# Patient Record
Sex: MALE | Race: WHITE
[De-identification: names, ages, dates, MRNs, and addresses within clinical notes are randomized per-mention and may not be internally consistent; named-entity substitution may affect disease eponyms.]

---

## 2017-05-25 ENCOUNTER — PRIOR ORIGINAL RECORDS (OUTPATIENT)
Dept: OTHER | Age: 57
End: 2017-05-25

## 2017-06-02 ENCOUNTER — PRIOR ORIGINAL RECORDS (OUTPATIENT)
Dept: OTHER | Age: 57
End: 2017-06-02

## 2018-01-16 ENCOUNTER — HOSPITAL ENCOUNTER (OUTPATIENT)
Dept: LAB | Facility: HOSPITAL | Age: 58
Discharge: HOME | End: 2018-01-16
Attending: INTERNAL MEDICINE
Payer: COMMERCIAL

## 2018-01-16 ENCOUNTER — PRIOR ORIGINAL RECORDS (OUTPATIENT)
Dept: OTHER | Age: 58
End: 2018-01-16

## 2018-01-16 LAB
ALBUMIN SERPL-MCNC: 4.2 G/DL (ref 3.5–4.8)
ALP LIVER SERPL-CCNC: 73 U/L (ref 45–117)
ALT SERPL-CCNC: 21 U/L (ref 17–63)
AST SERPL-CCNC: 18 U/L (ref 15–41)
BILIRUB SERPL-MCNC: 1.1 MG/DL (ref 0.1–2)
BUN BLD-MCNC: 30 MG/DL (ref 8–20)
CALCIUM BLD-MCNC: 8.8 MG/DL (ref 8.3–10.3)
CHLORIDE: 109 MMOL/L (ref 101–111)
CHOLEST SMN-MCNC: 223 MG/DL (ref ?–200)
CO2: 26 MMOL/L (ref 22–32)
CREAT BLD-MCNC: 1.5 MG/DL (ref 0.7–1.3)
GLUCOSE BLD-MCNC: 92 MG/DL (ref 70–99)
HDLC SERPL-MCNC: 34 MG/DL (ref 45–?)
HDLC SERPL: 6.56 {RATIO} (ref ?–4.97)
LDLC SERPL CALC-MCNC: 137 MG/DL (ref ?–130)
M PROTEIN MFR SERPL ELPH: 7.4 G/DL (ref 6.1–8.3)
NONHDLC SERPL-MCNC: 189 MG/DL (ref ?–130)
POTASSIUM SERPL-SCNC: 4 MMOL/L (ref 3.6–5.1)
SODIUM SERPL-SCNC: 143 MMOL/L (ref 136–144)
TRIGL SERPL-MCNC: 260 MG/DL (ref ?–150)
VLDLC SERPL CALC-MCNC: 52 MG/DL (ref 5–40)

## 2018-01-16 PROCEDURE — 80061 LIPID PANEL: CPT | Performed by: INTERNAL MEDICINE

## 2018-01-16 PROCEDURE — 36415 COLL VENOUS BLD VENIPUNCTURE: CPT | Performed by: INTERNAL MEDICINE

## 2018-01-16 PROCEDURE — 80053 COMPREHEN METABOLIC PANEL: CPT | Performed by: INTERNAL MEDICINE

## 2018-01-17 LAB
ALKALINE PHOSPHATATE(ALK PHOS): 73 IU/L
BILIRUBIN TOTAL: 1.1 MG/DL
BUN: 30 MG/DL
CALCIUM: 8.8 MG/DL
CHLORIDE: 109 MEQ/L
CHOLESTEROL, TOTAL: 223 MG/DL
CREATININE, SERUM: 1.5 MG/DL
GLUCOSE: 92 MG/DL
HDL CHOLESTEROL: 34 MG/DL
LDL CHOLESTEROL: 137 MG/DL
POTASSIUM, SERUM: 4 MEQ/L
PROTEIN, TOTAL: 7.4 G/DL
SGOT (AST): 18 IU/L
SGPT (ALT): 21 IU/L
SODIUM: 143 MEQ/L
TRIGLYCERIDES: 260 MG/DL

## 2018-01-18 ENCOUNTER — PRIOR ORIGINAL RECORDS (OUTPATIENT)
Dept: OTHER | Age: 58
End: 2018-01-18

## 2018-01-18 ENCOUNTER — MYAURORA ACCOUNT LINK (OUTPATIENT)
Dept: OTHER | Age: 58
End: 2018-01-18

## 2018-09-07 NOTE — ED NOTES
Attempted x 2 to contact patient's family. Voicemail message received both times, message just has phone number without identifying information so message was not left for patient's family.
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
## 2018-09-07 NOTE — ED PROVIDER NOTES
Patient Seen in: [facility] Emergency Department    History   Patient presents with:  Eval-P (psychiatric)    Stated Complaint: demenita patient agressive toward staff cooperative en route    HPI    Patient is a 59-year-old male who presents from [facility] and nontender throughout. No rebound or guarding  Extremities: No clubbing/cyanosis/edema. Skin: No rashes, no pallor  Neuro: Alert to baseline. Answering questions. Moves all extremities with equal strength. Normal speech.   ED Course     Labs Reviewe encounter diagnosis)  Mild dehydration  Disposition:  Discharge  9/7/2018 10:23 am    Follow-up:  Nona Hassan MD  [facility] [phone]    In 1 week          Medications Prescribed:  Discharge Medication List as of 9

## 2018-09-07 NOTE — ED NOTES
Attempted to call report to nursing facility. Received voicemail for the director of nursing. Message left on voicemail that patient will be coming back to their facility.  Yo Otto, [address] [place] [address] worker did speak with nursing facility staff earlier and let them k

## 2019-01-23 ENCOUNTER — PRIOR ORIGINAL RECORDS (OUTPATIENT)
Dept: OTHER | Age: 59
End: 2019-01-23

## 2019-02-28 VITALS
HEART RATE: 72 BPM | DIASTOLIC BLOOD PRESSURE: 98 MMHG | WEIGHT: 204 LBS | BODY MASS INDEX: 27.04 KG/M2 | HEIGHT: 73 IN | SYSTOLIC BLOOD PRESSURE: 162 MMHG

## 2019-10-12 ENCOUNTER — NURSE ONLY (OUTPATIENT)
Dept: LAB | Age: 59
End: 2019-10-12
Attending: FAMILY MEDICINE
Payer: COMMERCIAL

## 2019-10-12 DIAGNOSIS — Z00.00 ROUTINE GENERAL MEDICAL EXAMINATION AT A HEALTH CARE FACILITY: Primary | ICD-10-CM

## 2019-10-12 PROCEDURE — 84443 ASSAY THYROID STIM HORMONE: CPT

## 2019-10-12 PROCEDURE — 85025 COMPLETE CBC W/AUTO DIFF WBC: CPT

## 2019-10-12 PROCEDURE — 80061 LIPID PANEL: CPT

## 2019-10-12 PROCEDURE — 80053 COMPREHEN METABOLIC PANEL: CPT

## 2019-10-12 PROCEDURE — 36415 COLL VENOUS BLD VENIPUNCTURE: CPT

## 2019-10-16 ENCOUNTER — NURSE ONLY (OUTPATIENT)
Dept: LAB | Age: 59
End: 2019-10-16
Attending: FAMILY MEDICINE

## 2019-10-16 DIAGNOSIS — Z01.411 ENCNTR FOR GYN EXAM (GENERAL) (ROUTINE) W ABNORMAL FINDINGS: Primary | ICD-10-CM

## 2019-10-16 PROCEDURE — 84481 FREE ASSAY (FT-3): CPT

## 2019-10-16 PROCEDURE — 36415 COLL VENOUS BLD VENIPUNCTURE: CPT

## 2019-10-16 PROCEDURE — 84439 ASSAY OF FREE THYROXINE: CPT

## 2019-11-16 ENCOUNTER — NURSE ONLY (OUTPATIENT)
Dept: LAB | Age: 59
End: 2019-11-16
Attending: FAMILY MEDICINE
Payer: COMMERCIAL

## 2019-11-16 DIAGNOSIS — R69 DIAGNOSIS UNKNOWN: Primary | ICD-10-CM

## 2019-11-16 PROCEDURE — 84439 ASSAY OF FREE THYROXINE: CPT

## 2019-11-16 PROCEDURE — 36415 COLL VENOUS BLD VENIPUNCTURE: CPT

## 2019-11-16 PROCEDURE — 84443 ASSAY THYROID STIM HORMONE: CPT

## 2019-12-14 ENCOUNTER — NURSE ONLY (OUTPATIENT)
Dept: LAB | Age: 59
End: 2019-12-14
Attending: FAMILY MEDICINE
Payer: COMMERCIAL

## 2019-12-14 DIAGNOSIS — E03.9 HYPOTHYROIDISM: Primary | ICD-10-CM

## 2019-12-14 PROCEDURE — 84443 ASSAY THYROID STIM HORMONE: CPT

## 2019-12-14 PROCEDURE — 36415 COLL VENOUS BLD VENIPUNCTURE: CPT

## 2019-12-14 PROCEDURE — 84436 ASSAY OF TOTAL THYROXINE: CPT

## 2019-12-14 PROCEDURE — 84480 ASSAY TRIIODOTHYRONINE (T3): CPT

## 2020-04-30 ENCOUNTER — NURSE ONLY (OUTPATIENT)
Dept: LAB | Age: 60
End: 2020-04-30
Attending: INTERNAL MEDICINE
Payer: COMMERCIAL

## 2020-04-30 DIAGNOSIS — N39.0 UTI (URINARY TRACT INFECTION): Primary | ICD-10-CM

## 2020-04-30 PROCEDURE — 81003 URINALYSIS AUTO W/O SCOPE: CPT

## 2020-04-30 PROCEDURE — 87086 URINE CULTURE/COLONY COUNT: CPT

## 2020-04-30 PROCEDURE — 87077 CULTURE AEROBIC IDENTIFY: CPT

## 2020-04-30 PROCEDURE — 87186 SC STD MICRODIL/AGAR DIL: CPT

## 2020-10-17 ENCOUNTER — NURSE ONLY (OUTPATIENT)
Dept: LAB | Age: 60
End: 2020-10-17
Attending: INTERNAL MEDICINE
Payer: OTHER GOVERNMENT

## 2020-10-17 DIAGNOSIS — U07.1 COVID-19: Primary | ICD-10-CM

## 2020-10-17 PROCEDURE — 85025 COMPLETE CBC W/AUTO DIFF WBC: CPT

## 2020-10-17 PROCEDURE — 36415 COLL VENOUS BLD VENIPUNCTURE: CPT

## 2020-10-17 PROCEDURE — 85610 PROTHROMBIN TIME: CPT

## 2020-10-17 PROCEDURE — 80048 BASIC METABOLIC PNL TOTAL CA: CPT

## 2020-11-07 ENCOUNTER — NURSE ONLY (OUTPATIENT)
Dept: LAB | Age: 60
End: 2020-11-07
Attending: INTERNAL MEDICINE
Payer: COMMERCIAL

## 2020-11-07 DIAGNOSIS — R69 DIAGNOSIS UNKNOWN: Primary | ICD-10-CM

## 2020-11-07 PROCEDURE — 85025 COMPLETE CBC W/AUTO DIFF WBC: CPT

## 2020-11-07 PROCEDURE — 36415 COLL VENOUS BLD VENIPUNCTURE: CPT

## 2020-11-07 PROCEDURE — 80048 BASIC METABOLIC PNL TOTAL CA: CPT

## 2021-05-27 ENCOUNTER — APPOINTMENT (OUTPATIENT)
Dept: GENERAL RADIOLOGY | Facility: HOSPITAL | Age: 61
DRG: 074 | End: 2021-05-27
Attending: EMERGENCY MEDICINE
Payer: MEDICARE

## 2021-05-27 ENCOUNTER — APPOINTMENT (OUTPATIENT)
Dept: CT IMAGING | Facility: HOSPITAL | Age: 61
DRG: 074 | End: 2021-05-27
Attending: EMERGENCY MEDICINE
Payer: MEDICARE

## 2021-05-27 ENCOUNTER — HOSPITAL ENCOUNTER (INPATIENT)
Facility: HOSPITAL | Age: 61
LOS: 4 days | Discharge: NURSING, REHAB OR LONG-TERM CARE | DRG: 074 | End: 2021-05-31
Attending: EMERGENCY MEDICINE | Admitting: HOSPITALIST
Payer: MEDICARE

## 2021-05-27 DIAGNOSIS — L03.211 FACIAL CELLULITIS: ICD-10-CM

## 2021-05-27 DIAGNOSIS — R73.9 HYPERGLYCEMIA: ICD-10-CM

## 2021-05-27 DIAGNOSIS — R26.9 GAIT DISTURBANCE: ICD-10-CM

## 2021-05-27 DIAGNOSIS — R41.82 ALTERED MENTAL STATUS, UNSPECIFIED ALTERED MENTAL STATUS TYPE: Primary | ICD-10-CM

## 2021-05-27 PROCEDURE — 70450 CT HEAD/BRAIN W/O DYE: CPT | Performed by: EMERGENCY MEDICINE

## 2021-05-27 PROCEDURE — 99223 1ST HOSP IP/OBS HIGH 75: CPT | Performed by: HOSPITALIST

## 2021-05-27 PROCEDURE — 71045 X-RAY EXAM CHEST 1 VIEW: CPT | Performed by: EMERGENCY MEDICINE

## 2021-05-27 RX ORDER — ENOXAPARIN SODIUM 100 MG/ML
40 INJECTION SUBCUTANEOUS DAILY
Status: DISCONTINUED | OUTPATIENT
Start: 2021-05-27 | End: 2021-05-31

## 2021-05-27 RX ORDER — CLINDAMYCIN HYDROCHLORIDE 300 MG/1
300 CAPSULE ORAL 3 TIMES DAILY
Status: ON HOLD | COMMUNITY
End: 2021-05-28

## 2021-05-27 RX ORDER — LEVOTHYROXINE SODIUM 88 UG/1
88 TABLET ORAL
Status: DISCONTINUED | OUTPATIENT
Start: 2021-05-27 | End: 2021-05-28

## 2021-05-27 RX ORDER — AMLODIPINE BESYLATE 5 MG/1
5 TABLET ORAL DAILY
Status: DISCONTINUED | OUTPATIENT
Start: 2021-05-27 | End: 2021-05-31

## 2021-05-27 RX ORDER — SODIUM CHLORIDE 9 MG/ML
INJECTION, SOLUTION INTRAVENOUS CONTINUOUS
Status: DISCONTINUED | OUTPATIENT
Start: 2021-05-27 | End: 2021-05-31

## 2021-05-27 RX ORDER — ATORVASTATIN CALCIUM 40 MG/1
40 TABLET, FILM COATED ORAL DAILY
Status: DISCONTINUED | OUTPATIENT
Start: 2021-05-27 | End: 2021-05-28

## 2021-05-27 RX ORDER — PROCHLORPERAZINE EDISYLATE 5 MG/ML
5 INJECTION INTRAMUSCULAR; INTRAVENOUS EVERY 8 HOURS PRN
Status: DISCONTINUED | OUTPATIENT
Start: 2021-05-27 | End: 2021-05-31

## 2021-05-27 RX ORDER — MOMETASONE FUROATE 1 MG/G
OINTMENT TOPICAL DAILY
Status: ON HOLD | COMMUNITY
End: 2021-05-28

## 2021-05-27 RX ORDER — ONDANSETRON 2 MG/ML
4 INJECTION INTRAMUSCULAR; INTRAVENOUS EVERY 6 HOURS PRN
Status: DISCONTINUED | OUTPATIENT
Start: 2021-05-27 | End: 2021-05-31

## 2021-05-27 RX ORDER — ALPRAZOLAM 0.5 MG/1
0.5 TABLET ORAL NIGHTLY PRN
Status: DISCONTINUED | OUTPATIENT
Start: 2021-05-27 | End: 2021-05-28

## 2021-05-27 RX ORDER — LACTULOSE 10 G/15ML
20 SOLUTION ORAL 2 TIMES DAILY
Status: COMPLETED | OUTPATIENT
Start: 2021-05-27 | End: 2021-05-30

## 2021-05-27 RX ORDER — LISINOPRIL 40 MG/1
40 TABLET ORAL DAILY
Status: DISCONTINUED | OUTPATIENT
Start: 2021-05-27 | End: 2021-05-28

## 2021-05-27 RX ORDER — ASPIRIN 325 MG
325 TABLET ORAL DAILY
Status: DISCONTINUED | OUTPATIENT
Start: 2021-05-27 | End: 2021-05-28

## 2021-05-27 RX ORDER — QUETIAPINE 25 MG/1
50 TABLET, FILM COATED ORAL NIGHTLY
Status: DISCONTINUED | OUTPATIENT
Start: 2021-05-27 | End: 2021-05-28

## 2021-05-27 NOTE — ED QUICK NOTES
[facility]    [address] [place] [address]    Phone:  [phone]    Fax:  [phone]       Thank You for choosing us for your health care visit. We are glad to serve you and happy to provide you with this summary of your visit. Please help us to ensure we have accurate records. If you find anything that needs to be changed, please let our staff know as soon as possible.          Your Demographic Information     Patient Name Sex     Gustavo Caicedo Male 1961       Ethnic Group Patient Race    / Origin White      Your Visit Details     Date & Time Provider Department    3/29/2017 8:40 AM ARNEL Edward [facility]      Your Upcoming Appointment*(Max 10)     2017 11:00 AM CDT   RED New Patient with Damien Givens LPC   [facility] ([facility])    [address] [place] [address]   [phone]            2017  3:00 PM CDT   Follow-up Visit with Rigoberto Workman MD   [facility] ([facility])    [address] [place] [address]   [phone]              Your To Do List     Follow-Up    Return in about 1 week (around 2017) for Follow up with Dr. Garza.      Conditions Discussed Today or Order-Related Diagnoses        Comments    Acute maxillary sinusitis, recurrence not specified    -  Primary     Frontal headache           Your Vitals Were     BP Pulse Temp Resp Height Weight    150/94 72 98 °F (36.7 °C) (Tympanic) 16 5' 8\" (1.727 m) 202 lb (91.6 kg)    BMI Smoking Status                30.71 kg/m2 Current Some Day Smoker          Medications Prescribed or Re-Ordered Today     fluticasone (FLONASE) 50 MCG/ACT nasal spray    Sig - Route: Spray 1 spray in each nostril daily. - Nasal    Class: Eprescribe    Pharmacy: [facility]  Pt still very confused, taking self off monitor. Removing mask.  In sight of nurses station for safety #[id number] - [facility] [address] [address] Ph #: [phone]    acetaminophen (TYLENOL) 500 MG tablet    Sig - Route: Take 1 tablet by mouth every 6 hours as needed for Pain or Fever. - Oral    Class: Eprescribe    Pharmacy: [facility] #[id number] - [facility] [address] [facility] [address] Ph #: [phone]      Your Current Medications Are        Disp Refills Start End    doxycycline hyclate (VIBRAMYCIN) 100 MG capsule 20 capsule 0 3/23/2017     Sig - Route: Take 1 capsule by mouth 2 times daily. - Oral    Class: Eprescribe    clonazePAM (KLONOPIN) 0.5 MG tablet 60 tablet 0 3/21/2017     Sig - Route: Take 1 tablet by mouth 2 times daily as needed for Anxiety. - Oral    Class: Script Not Printed    Cosign for Ordering: Accepted by ARNEL Edward on 3/21/2017 12:36 PM    ranitidine (ZANTAC) 150 MG tablet 60 tablet 5 2/14/2017     Sig - Route: Take 1 tablet by mouth 2 times daily. - Oral    Class: Eprescribe    carvedilol (COREG) 12.5 MG tablet 60 tablet 11 2/14/2017     Sig - Route: Take 1 tablet by mouth 2 times daily (with meals). - Oral    Class: Eprescribe    losartan (COZAAR) 50 MG tablet 30 tablet 9 10/18/2016     Sig: TAKE ONE TABLET BY MOUTH ONCE DAILY    Class: Eprescribe    Cosign for Ordering: Accepted by Rigoberto Workman MD on 10/18/2016  1:20 PM    citalopram (CELEXA) 40 MG tablet 30 tablet 11 5/21/2016     Sig - Route: Take 1 tablet by mouth daily. - Oral    Class: Eprescribe    levothyroxine (SYNTHROID, LEVOTHROID) 88 MCG tablet 90 tablet 1 3/22/2016     Sig - Route: Take 1 tablet by mouth daily. Take 30 minutes prior to breakfast. - Oral    Class: Eprescribe    Cholecalciferol (VITAMIN D) 2000 UNITS CAPS   9/19/2014     Sig - Route: Take 1 capsule by mouth daily. - Oral    Class: Historical Med    fluticasone (FLONASE) 50 MCG/ACT nasal spray 16 g 1 3/29/2017     Sig - Route: Spray 1 spray in each nostril daily. - Nasal    Class: Eprescribe    acetaminophen  (TYLENOL) 500 MG tablet 30 tablet 0 3/29/2017     Sig - Route: Take 1 tablet by mouth every 6 hours as needed for Pain or Fever. - Oral    Class: Eprescribe      Allergies     Augmentin [Amoclan]     diarrhea    Codeine     Rash, constipation      Immunizations History as of 3/29/2017     Name Date    Hepatitis A - Adult 8/31/2010, 1/21/2010    Hepatitis B Adult 8/31/2010, 3/2/2010, 1/21/2010    INFLUENZA QUADRIVALENT 10/6/2014  3:08 PM    Influenza 10/3/2012    Influenza Quadrivalent Preservative Free 11/18/2016, 10/12/2015  8:47 AM, 10/7/2013    Pneumococcal Polysaccharide Adult 12/7/2012    Td:Adult type tetanus/diphtheria 9/5/1984    Tdap 12/7/2012      Problem List as of 3/29/2017     Low back pain    GERD (gastroesophageal reflux disease)    Palpitations    Hypothyroidism    Benign essential hypertension    Type 2 diabetes mellitus without complication            Patient Instructions     None

## 2021-05-27 NOTE — ED QUICK NOTES
Called to nursing facility now to see if pt has been on any abx for rash to face. back. Per staff, orders were called in this AM but never given to pt.  MD made aware

## 2021-05-27 NOTE — ED QUICK NOTES
POA son Cyndi Singhs called now for update. Update given.  Can be reached at [phone]    Daughter of pt Deborah Schumacher [phone] if son cannot be reached

## 2021-05-28 ENCOUNTER — NURSE ONLY (OUTPATIENT)
Dept: ELECTROPHYSIOLOGY | Facility: HOSPITAL | Age: 61
DRG: 074 | End: 2021-05-28
Attending: Other
Payer: MEDICARE

## 2021-05-28 PROCEDURE — 99233 SBSQ HOSP IP/OBS HIGH 50: CPT | Performed by: HOSPITALIST

## 2021-05-28 PROCEDURE — 95816 EEG AWAKE AND DROWSY: CPT | Performed by: OTHER

## 2021-05-28 PROCEDURE — 99223 1ST HOSP IP/OBS HIGH 75: CPT | Performed by: OTHER

## 2021-05-28 RX ORDER — ESCITALOPRAM OXALATE 5 MG/1
5 TABLET ORAL DAILY
Status: DISCONTINUED | OUTPATIENT
Start: 2021-05-28 | End: 2021-05-31

## 2021-05-28 RX ORDER — ATORVASTATIN CALCIUM 20 MG/1
20 TABLET, FILM COATED ORAL NIGHTLY
COMMUNITY

## 2021-05-28 RX ORDER — FUROSEMIDE 20 MG/1
20 TABLET ORAL EVERY MORNING
COMMUNITY
Start: 2021-05-26

## 2021-05-28 RX ORDER — ATORVASTATIN CALCIUM 20 MG/1
20 TABLET, FILM COATED ORAL DAILY
Status: DISCONTINUED | OUTPATIENT
Start: 2021-05-29 | End: 2021-05-31

## 2021-05-28 RX ORDER — IPRATROPIUM BROMIDE AND ALBUTEROL SULFATE 2.5; .5 MG/3ML; MG/3ML
3 SOLUTION RESPIRATORY (INHALATION) EVERY 6 HOURS PRN
Status: DISCONTINUED | OUTPATIENT
Start: 2021-05-28 | End: 2021-05-31

## 2021-05-28 RX ORDER — LEVOTHYROXINE SODIUM 0.05 MG/1
100 TABLET ORAL
Status: DISCONTINUED | OUTPATIENT
Start: 2021-05-29 | End: 2021-05-28

## 2021-05-28 RX ORDER — ASPIRIN 81 MG/1
81 TABLET ORAL DAILY
COMMUNITY

## 2021-05-28 RX ORDER — TOBRAMYCIN 3 MG/ML
2 SOLUTION/ DROPS OPHTHALMIC
Status: DISCONTINUED | OUTPATIENT
Start: 2021-05-27 | End: 2021-05-29

## 2021-05-28 RX ORDER — ASPIRIN 81 MG/1
81 TABLET ORAL DAILY
Status: DISCONTINUED | OUTPATIENT
Start: 2021-05-29 | End: 2021-05-31

## 2021-05-28 RX ORDER — ACETAMINOPHEN 325 MG/1
650 TABLET ORAL EVERY 4 HOURS PRN
Status: DISCONTINUED | OUTPATIENT
Start: 2021-05-28 | End: 2021-05-31

## 2021-05-28 RX ORDER — LORAZEPAM 0.5 MG/1
0.25 TABLET ORAL 2 TIMES DAILY
Status: DISCONTINUED | OUTPATIENT
Start: 2021-05-28 | End: 2021-05-31

## 2021-05-28 RX ORDER — LISINOPRIL 10 MG/1
10 TABLET ORAL DAILY
COMMUNITY
Start: 2021-05-26

## 2021-05-28 RX ORDER — GUAIFENESIN 100 MG/5ML
200 SYRUP ORAL EVERY 4 HOURS PRN
COMMUNITY

## 2021-05-28 RX ORDER — ACETAMINOPHEN 325 MG/1
650 TABLET ORAL EVERY 4 HOURS PRN
COMMUNITY

## 2021-05-28 RX ORDER — GUAIFENESIN 100 MG/5ML
400 SYRUP ORAL EVERY 8 HOURS PRN
COMMUNITY

## 2021-05-28 RX ORDER — PREDNISOLONE ACETATE 10 MG/ML
1 SUSPENSION/ DROPS OPHTHALMIC 4 TIMES DAILY
Status: DISCONTINUED | OUTPATIENT
Start: 2021-05-28 | End: 2021-05-31

## 2021-05-28 RX ORDER — QUETIAPINE 25 MG/1
75 TABLET, FILM COATED ORAL 3 TIMES DAILY
Status: DISCONTINUED | OUTPATIENT
Start: 2021-05-28 | End: 2021-05-28

## 2021-05-28 RX ORDER — LORAZEPAM 1 MG/1
1 TABLET ORAL EVERY 6 HOURS PRN
COMMUNITY

## 2021-05-28 RX ORDER — POTASSIUM CHLORIDE 20 MEQ/1
40 TABLET, EXTENDED RELEASE ORAL EVERY 4 HOURS
Status: COMPLETED | OUTPATIENT
Start: 2021-05-28 | End: 2021-05-28

## 2021-05-28 RX ORDER — QUETIAPINE 25 MG/1
25 TABLET, FILM COATED ORAL 3 TIMES DAILY
COMMUNITY
Start: 2021-05-26

## 2021-05-28 RX ORDER — CLINDAMYCIN HYDROCHLORIDE 300 MG/1
300 CAPSULE ORAL 3 TIMES DAILY
Status: ON HOLD | COMMUNITY
Start: 2021-05-27 | End: 2021-05-31

## 2021-05-28 RX ORDER — FUROSEMIDE 20 MG/1
20 TABLET ORAL EVERY MORNING
Status: DISCONTINUED | OUTPATIENT
Start: 2021-05-28 | End: 2021-05-31

## 2021-05-28 RX ORDER — CLINDAMYCIN HYDROCHLORIDE 150 MG/1
300 CAPSULE ORAL 3 TIMES DAILY
Status: DISCONTINUED | OUTPATIENT
Start: 2021-05-27 | End: 2021-05-29

## 2021-05-28 RX ORDER — MOMETASONE FUROATE 1 MG/G
CREAM TOPICAL 2 TIMES DAILY
COMMUNITY
Start: 2021-05-27

## 2021-05-28 RX ORDER — LORAZEPAM 1 MG/1
1 TABLET ORAL EVERY 6 HOURS PRN
Status: DISCONTINUED | OUTPATIENT
Start: 2021-05-28 | End: 2021-05-28

## 2021-05-28 RX ORDER — LISINOPRIL 10 MG/1
10 TABLET ORAL DAILY
Status: DISCONTINUED | OUTPATIENT
Start: 2021-05-29 | End: 2021-05-31

## 2021-05-28 RX ORDER — IPRATROPIUM BROMIDE AND ALBUTEROL SULFATE 2.5; .5 MG/3ML; MG/3ML
3 SOLUTION RESPIRATORY (INHALATION) EVERY 6 HOURS PRN
COMMUNITY

## 2021-05-28 RX ORDER — LORAZEPAM 0.5 MG/1
0.25 TABLET ORAL 2 TIMES DAILY
COMMUNITY

## 2021-05-28 RX ORDER — DIVALPROEX SODIUM 125 MG/1
125 CAPSULE, COATED PELLETS ORAL 2 TIMES DAILY
Status: DISCONTINUED | OUTPATIENT
Start: 2021-05-28 | End: 2021-05-28

## 2021-05-28 RX ORDER — DIVALPROEX SODIUM 125 MG/1
125 CAPSULE, COATED PELLETS ORAL 2 TIMES DAILY
Status: ON HOLD | COMMUNITY
Start: 2021-05-14 | End: 2021-05-31

## 2021-05-28 RX ORDER — QUETIAPINE 25 MG/1
25 TABLET, FILM COATED ORAL NIGHTLY
Status: DISCONTINUED | OUTPATIENT
Start: 2021-05-28 | End: 2021-05-29

## 2021-05-28 RX ORDER — LEVOTHYROXINE SODIUM 0.12 MG/1
125 TABLET ORAL
COMMUNITY

## 2021-05-28 RX ORDER — TOBRAMYCIN 3 MG/ML
2 SOLUTION/ DROPS OPHTHALMIC
Status: ON HOLD | COMMUNITY
Start: 2021-05-27 | End: 2021-05-31

## 2021-05-28 RX ORDER — LEVOTHYROXINE SODIUM 0.12 MG/1
125 TABLET ORAL
Status: DISCONTINUED | OUTPATIENT
Start: 2021-05-29 | End: 2021-05-31

## 2021-05-28 RX ORDER — ESCITALOPRAM OXALATE 5 MG/1
5 TABLET ORAL DAILY
COMMUNITY
Start: 2021-05-26

## 2021-05-28 RX ORDER — CHOLECALCIFEROL (VITAMIN D3) 125 MCG
1 CAPSULE ORAL NIGHTLY
Status: ON HOLD | COMMUNITY
End: 2021-05-31

## 2021-05-28 RX ORDER — LORAZEPAM 1 MG/1
1 TABLET ORAL EVERY 6 HOURS PRN
Status: DISCONTINUED | OUTPATIENT
Start: 2021-05-28 | End: 2021-05-31

## 2021-05-28 NOTE — PROGRESS NOTES
NURSING ADMISSION NOTE      Patient admitted via Cart  Oriented to room. Safety precautions initiated. Bed in low position. Call light in reach.     PT admitted with paperwork from NH facility  MD made aware of any med changes on med rec sheet  Pt A/

## 2021-05-28 NOTE — ED QUICK NOTES
Pt changed into clean dry brief, sheets and pads. IV site wrapped with ace bandage, new mask applied. Pt frequently removes his mask and is unable to follow directions.

## 2021-05-28 NOTE — PLAN OF CARE
Patient alert to self. Drowsy. Unable to follow commands. Contact isolation. Redness and swelling to R forehead. ID on consult for shingles. IV acyclovir. IVF. NSR-SB on tele. On RA. Optho to see. Eyedrops to R eye. Bed alarm.   Family updated p

## 2021-05-28 NOTE — PROGRESS NOTES
[facility]  Progress Note     Yohannes Yancey Patient Status:  Inpatient    3/11/1960 MRN [id number]   [facility] 3NE-A Attending Dimitri Garcia MD   [address] Day # 1 PCP Krista Duong MD     Chief Complaint: AMS   S:  Mental hours. Cardiac  No results for input(s): TROP, PBNP in the last 168 hours. Creatinine Kinase  No results for input(s): CK in the last 168 hours.     Inflammatory Markers  Recent Labs   Lab 05/27/21  1651   CRP 0.90*      Imaging: Imaging data reviewed Fred Quezada MD      **Certification      PHYSICIAN Certification of Need for Inpatient Hospitalization - Initial Certification    Patient will require inpatient services that will reasonably be expected to span two midnight's based on the clinical documentation

## 2021-05-28 NOTE — CONSULTS
[facility]    Report of Consultation    Zac Choi Patient Status:  Inpatient    3/11/1960 MRN [id number]   [facility] 3NE-A Attending Glenn Richardson MD   Hosp Day # 1 PCP Ladarius Moran MD     Assessment and plan  1.  Alt Unspecified essential hypertension      History reviewed. No pertinent surgical history. Family History   Problem Relation Age of Onset   • Stroke Other    • Cancer Other    • Heart Disease Other       reports that he has never smoked.  He has never used s tracking. Pupils equally round and reactive to light. 3+ brisk bilaterally. EOMs intact. Face symmetric. Tongue is midline. No oral dyskinesia. The rest of the cranial nerves are grossly intact. Sensation to light touch is intact bilaterally.   Motor:

## 2021-05-28 NOTE — DIETARY NOTE
[address]     Admitting diagnosis:  Gait disturbance [R26.9]  Hyperglycemia [R73.9]  Facial cellulitis [X51.114]  Altered mental status, unspecified altered mental status type [R41.82]    Ht: 185.4 cm (6' 1\")

## 2021-05-28 NOTE — ED PROVIDER NOTES
Patient Seen in: [facility] Emergency Department      History   Patient presents with:  Altered Mental Status    Stated Complaint: Alertered mental status.      HPI/Subjective:   HPI    58-year-old male with a history of dementia presents with radhaa Heart sounds: Normal heart sounds. Pulmonary:      Effort: Pulmonary effort is normal.      Breath sounds: Normal breath sounds. No stridor. Abdominal:      General: Bowel sounds are normal.      Palpations: Abdomen is soft.    Musculoskeletal: RATEPHILLIP (AUTOMATED) - Normal   RAPID SARS-COV-2 BY PCR - Normal   CBC WITH DIFFERENTIAL WITH PLATELET    Narrative: The following orders were created for panel order CBC WITH DIFFERENTIAL WITH PLATELET.   Procedure Dictated by (CST): Daxa Baker MD on 5/27/2021 at 6:09 PM     Finalized by (CST): Daxa Baker MD on 5/27/2021 at 6:10 PM       XR CHEST AP PORTABLE  (CPT=71045)    Result Date: 5/27/2021  PROCEDURE:  XR CHEST AP PORTABLE  (CPT=71045)  TECHNIQUE: type  (primary encounter diagnosis)  Gait disturbance  Facial cellulitis  Hyperglycemia     Disposition:  Admit  5/27/2021  6:42 pm    Follow-up:  No follow-up provider specified.         Medications Prescribed:  Current Discharge Medication List

## 2021-05-28 NOTE — PHYSICAL THERAPY NOTE
PT orders received and chart reviewed. Per RN pt is not following commands at this time. Will hold therapy at this time. Will follow and re-attempt as able and appropriate.

## 2021-05-28 NOTE — OCCUPATIONAL THERAPY NOTE
OCCUPATIONAL THERAPY                            OT orders received and chart reviewed. The patient is not appropriate for therapy at this time, not following commands. Will re-attempt at a later date when appropriate.

## 2021-05-28 NOTE — ED QUICK NOTES
Nursing Supervisor Sheree Barone calling about sitter request. Made aware that patient was moved closer to nurses station for safety of patient. Was previously getting out of bed and pulling equipment off self while unable to be redirected due to mental status.  No

## 2021-05-28 NOTE — H&P
[facility]  History and Physical     Adron Dennise Patient Status:  Emergency    3/11/1960 MRN [id number]   Location [address] [facility] Attending Sana Jerez MD   Hosp Day # 0 PCP Mishel Aly MD     Chief Compla (LIPITOR) 40 MG Oral Tab, Take 40 mg by mouth daily. , Disp: , Rfl:   Metoprolol Tartrate 50 MG Oral Tab, Take 50 mg by mouth 2 (two) times daily. , Disp: , Rfl:   AmLODIPine Besylate 5 MG Oral Tab, Take 5 mg by mouth daily. , Disp: , Rfl:   Lisinopril 40 MG PTP, INR in the last 168 hours. COVID-19 Lab Results    COVID-19  Lab Results   Component Value Date    COVID19 Not Detected 05/27/2021       Pro-Calcitonin  No results for input(s): PCT in the last 168 hours.     Cardiac  No results for input(s): TROP,

## 2021-05-28 NOTE — CONSULTS
INFECTIOUS DISEASE CONSULTATION    Behzad Carenhilaria Patient Status:  Inpatient    3/11/1960 MRN [id number]   [facility] 3NE-A Attending Hyun Cazares MD   Hosp Day # 1 PCP Kayley Cook (KENALOG) 0.1 % cream, , Topical, BID  •  Tobramycin Sulfate (TOBREX) 0.3 % ophthalmic solution 2 drop, 2 drop, Right Eye, Q4H WA (5 times daily)  •  guaiFENesin (ROBITUSSIN) 100mg/5ml LIQUID 200 mg, 200 mg, Oral, Q4H PRN  •  guaiFENesin (ROBITUSSIN) 100mg Rfl:   guaiFENesin (SILTUSSIN SA) 100 MG/5ML Oral Syrup, Take 200 mg by mouth every 4 (four) hours as needed. , Disp: , Rfl:   QUEtiapine Fumarate 50 MG Oral Tab, Take 50 mg by mouth 3 (three) times daily.  With 25 mg total of 75 mg tid , Disp: , Rfl:   AmLO 3. 31   WBC 5.5   .0*       Recent Labs   Lab 05/27/21  1651 05/28/21  0543   * 103*   BUN 27* 25*   CREATSERUM 1.37* 1.36*   GFRAA 63 64   GFRNAA 55* 55*   CA 8.1* 8.3*   ALB 3.6  --     144   K 3.8 3.6    114*   CO2 24.0 23.0   A

## 2021-05-29 PROCEDURE — 99233 SBSQ HOSP IP/OBS HIGH 50: CPT | Performed by: OTHER

## 2021-05-29 PROCEDURE — 99233 SBSQ HOSP IP/OBS HIGH 50: CPT | Performed by: HOSPITALIST

## 2021-05-29 RX ORDER — QUETIAPINE 25 MG/1
25 TABLET, FILM COATED ORAL NIGHTLY PRN
Status: DISCONTINUED | OUTPATIENT
Start: 2021-05-29 | End: 2021-05-31

## 2021-05-29 RX ORDER — TROPICAMIDE 10 MG/ML
1 SOLUTION/ DROPS OPHTHALMIC ONCE
Status: COMPLETED | OUTPATIENT
Start: 2021-05-29 | End: 2021-05-29

## 2021-05-29 RX ORDER — VALACYCLOVIR HYDROCHLORIDE 500 MG/1
1000 TABLET, FILM COATED ORAL EVERY 8 HOURS SCHEDULED
Status: DISCONTINUED | OUTPATIENT
Start: 2021-05-30 | End: 2021-05-31

## 2021-05-29 RX ORDER — VALACYCLOVIR HYDROCHLORIDE 1 G/1
1000 TABLET, FILM COATED ORAL EVERY 8 HOURS SCHEDULED
Qty: 30 TABLET | Refills: 0 | Status: SHIPPED | OUTPATIENT
Start: 2021-05-31 | End: 2021-06-10

## 2021-05-29 NOTE — PLAN OF CARE
Assumed care at [address]. Alert to self, RA, sinus li on tele, VSS. Regular diet, feeder. Lactulose. Lovenox subcutaneous. Primofit, briefed. 0.9 infusing at 100mL/hour in R AC. IV acyclovir, eye drops, kenalog cream. Pt resting in bed.  Will continue to mon Consider OT/PT consult to assist with strengthening/mobility  - Encourage toileting schedule  Outcome: Progressing     Problem: DISCHARGE PLANNING  Goal: Discharge to home or other facility with appropriate resources  Description: INTERVENTIONS:  - Identif

## 2021-05-29 NOTE — CONSULTS
[facility]    PATIENT'S NAME: Srinivasa Ferreira   ATTENDING PHYSICIAN: Marti Pinto M.D.   CONSULTING PHYSICIAN: Duncan Blandon M.D.    PATIENT ACCOUNT#:   [de-identified]    LOCATION:  [address] [place] [address]  MEDICAL RECORD #:   [id number]       DATE OF BIRTH:  [birth year] have discontinued the tobramycin. I asked that he follow up within 1 week of discharge. I spoke with his son/ZIA Jean.     Dictated By Chaz Huerta M.D.  d: 05/29/2021 09:21:54  t: 05/29/2021 12:05:13  [place] [id number]  /

## 2021-05-29 NOTE — PHYSICAL THERAPY NOTE
Orders received, chart reviewed. Attempted PT Evaluation. Patient was received in bed, awake and was only able to respond to name 50% of time. Patient was unable to follow any commands with verbal/tactile cues provided.   Patient is supposed to discharge

## 2021-05-29 NOTE — PLAN OF CARE
Patient alert and oriented to self. On Ra, . NSR on tele. Denies pain. IV acyclovir. IVF. Plan for PO valtrex in am.  Eyedrops. Contact isolation. Primofit. Lactulose BID. No bms today. Resting comfortably in bed. Will continue to monitor.

## 2021-05-29 NOTE — PROGRESS NOTES
[facility]  Progress Note     Alec Cordova Patient Status:  Inpatient    3/11/1960 MRN [id number]   [facility] 3NE-A Attending Marquis Quirzo MD   Hosp Day # 2 PCP Francisco Guevara MD     Chief Complaint: AMS   S:  Mental 05/27/2021       Pro-Calcitonin  No results for input(s): PCT in the last 168 hours. Cardiac  No results for input(s): TROP, PBNP in the last 168 hours. Creatinine Kinase  No results for input(s): CK in the last 168 hours.     Inflammatory Markers  Re

## 2021-05-29 NOTE — PROGRESS NOTES
[facility]    Progress Note    Bailey Casas Patient Status:  Inpatient    3/11/1960 MRN [id number]   [facility] 3NE-A Attending Naveen Jovel MD   Hosp Day # 2 PCP Mirian Mishra MD     Subjective:  Bailey Casas is a(n nonsensical. Had extensive neurological work up and diagnosed with early onset Alzheimer's    Discussed CT head and EEG results.  MRI brain not necessary at this time, was considering it before given patient's speech but this appears to be his baseline so n

## 2021-05-29 NOTE — OCCUPATIONAL THERAPY NOTE
OCCUPATIONAL THERAPY                      OT order received, chart reviewed. Attempted OT evaluation. The patient was awake and responded less than 50% of the time to name. He did not follow commands or attend to most verbal or visual cues.  Per

## 2021-05-29 NOTE — PROCEDURES
ELECTROENCEPHALOGRAM REPORT      Patient Name: Viet Marinelli  Chart ID: [id number]  Ordering Physician: No name on file.  Date of Test: 5/28/2021  Referring Physician:   Patient Diagnosis: Altered mental status    HISTORY  Viet Marinelli is a a(n) 59 ye

## 2021-05-29 NOTE — PROGRESS NOTES
[facility]                INFECTIOUS DISEASE PROGRESS NOTE    Behzad Roe Patient Status:  Inpatient    3/11/1960 MRN [id number]   [facility] 3NE-A Attending Hyun Cazares MD   Hosp Day # 2 PCP Lolis Mayorga MD     An reviewed:  Patient Active Problem List:     Metatarsal fracture     Altered mental status     Altered mental status, unspecified altered mental status type     Gait disturbance     Facial cellulitis     Hyperglycemia     Abrasion     Elevated C-reactive pr

## 2021-05-30 ENCOUNTER — APPOINTMENT (OUTPATIENT)
Dept: GENERAL RADIOLOGY | Facility: HOSPITAL | Age: 61
DRG: 074 | End: 2021-05-30
Attending: HOSPITALIST
Payer: MEDICARE

## 2021-05-30 PROCEDURE — 74018 RADEX ABDOMEN 1 VIEW: CPT | Performed by: HOSPITALIST

## 2021-05-30 PROCEDURE — 99233 SBSQ HOSP IP/OBS HIGH 50: CPT | Performed by: HOSPITALIST

## 2021-05-30 NOTE — PLAN OF CARE
Assumed pt care at 0730. A&Ox1. VSS. Room air. NSR/SB on tele, rates 40s-60s - MD aware. Contact isolation maintained per protocol. R AC PIV infusing 0.9NS @ 100 mL/hr. PO Valacyclovir Q8H. Lovenox subcutaneous & SCDs for VTE prevention.  Regular diet, 1 ep guidelines  Outcome: Progressing     Problem: SAFETY ADULT - FALL  Goal: Free from fall injury  Description: INTERVENTIONS:  - Assess pt frequently for physical needs  - Identify cognitive and physical deficits and behaviors that affect risk of falls.   - I

## 2021-05-30 NOTE — PROGRESS NOTES
[facility]  Progress Note     Ela Umaña Patient Status:  Inpatient    3/11/1960 MRN [id number]   [facility] 3NE-A Attending Halie Palm MD   Hosp Day # 3 PCP Glenny Cross MD     Chief Complaint: AMS   S:  No over Not Detected 05/27/2021       Pro-Calcitonin  No results for input(s): PCT in the last 168 hours. Cardiac  No results for input(s): TROP, PBNP in the last 168 hours. Creatinine Kinase  No results for input(s): CK in the last 168 hours.     Inflammator

## 2021-05-30 NOTE — PLAN OF CARE
Assumed care at [address]. Alert to self, RA, sinus li on tele, VSS. Regular diet, feeder. Lactulose. Lovenox subcutaneous. Primofit, briefed. 0.9 infusing at 100mL/hour in R AC. IV acyclovir, eye drops, kenalog cream. Pt resting in bed.  Will continue to mon with strengthening/mobility  - Encourage toileting schedule  Outcome: Progressing     Problem: DISCHARGE PLANNING  Goal: Discharge to home or other facility with appropriate resources  Description: INTERVENTIONS:  - Identify barriers to discharge w/pt and

## 2021-05-31 VITALS
RESPIRATION RATE: 18 BRPM | OXYGEN SATURATION: 96 % | HEART RATE: 45 BPM | BODY MASS INDEX: 30.19 KG/M2 | DIASTOLIC BLOOD PRESSURE: 65 MMHG | HEIGHT: 73 IN | SYSTOLIC BLOOD PRESSURE: 128 MMHG | WEIGHT: 227.81 LBS | TEMPERATURE: 99 F

## 2021-05-31 PROCEDURE — 99239 HOSP IP/OBS DSCHRG MGMT >30: CPT | Performed by: HOSPITALIST

## 2021-05-31 RX ORDER — PREDNISOLONE ACETATE 10 MG/ML
1 SUSPENSION/ DROPS OPHTHALMIC 4 TIMES DAILY
Qty: 1 EACH | Refills: 0 | Status: SHIPPED | OUTPATIENT
Start: 2021-05-31 | End: 2021-06-12

## 2021-05-31 NOTE — PLAN OF CARE
NURSING DISCHARGE NOTE    Discharged Nursing home via Ambulance. Accompanied by Support staff  Belongings Taken by patient/family. Pt discharged in calm, stable status back to [facility]. Report given to Ewelina Zimmerman at [phone].  All discharge paperwork &

## 2021-05-31 NOTE — PLAN OF CARE
Assumed pt care at 0730. A&Ox1. VSS. Room air. NSR/SB on tele, rates 40s-50s - MD aware. Contact isolation maintained per protocol. R AC PIV infusing 0.9NS @ 100 mL/hr. PO Valacyclovir Q8H. Lovenox subcutaneous & SCDs for VTE prevention.  Regular diet, poor as ordered  - Implement neutropenic guidelines  Outcome: Progressing     Problem: SAFETY ADULT - FALL  Goal: Free from fall injury  Description: INTERVENTIONS:  - Assess pt frequently for physical needs  - Identify cognitive and physical deficits and behav

## 2021-05-31 NOTE — CM/SW NOTE
MSW was notified that the patient is cleared to return to the [address] [place] [address] today: [phone]. RN will call report. [facility] arranged for 4pm . PCS form completed in Epic.     Murali Begum LCSW

## 2021-05-31 NOTE — PROGRESS NOTES
[facility]  Progress Note     Iza Perez Patient Status:  Inpatient    3/11/1960 MRN [id number]   [facility] 3NE-A Attending Bud Pulliam MD   Hosp Day # 4 PCP Tamiko Valente MD     Chief Complaint: AMS   S:  No over COVID19 Not Detected 05/27/2021       Pro-Calcitonin  No results for input(s): PCT in the last 168 hours. Cardiac  No results for input(s): TROP, PBNP in the last 168 hours. Creatinine Kinase  No results for input(s): CK in the last 168 hours.     Inf

## 2021-05-31 NOTE — PLAN OF CARE
Pt. Alert but not oriented, on RA, tele shows SB. Pt. Very confused and unable to follow direction. Pt. Nauseous, PRN given with good affect. Incontinent, offered turning Q2. 0.9 infusing at 100/hr. Isolation, fall, and safety precautions in place.  Will co affect risk of falls.   - [place] fall precautions as indicated by assessment.  - Educate pt/family on patient safety including physical limitations  - Instruct pt to call for assistance with activity based on assessment  - Modify environment to reduce ri

## 2021-06-01 NOTE — DISCHARGE SUMMARY
[facility]  DISCHARGE SUMMARY     Geetha Shelter Patient Status:  Inpatient    3/11/1960 MRN [id number]   [facility] 3NE-A Attending No att. providers found   [facility] Day # 4 PCP Rohan Monday, MD     Date of Admission:  status returned to baseline. Right forehead and eye was suspicious for herpes zoster. Infectious disease and ophthalmology were placed on consultation. Neurology was also following throughout the hospital course.   At extensive discussion with the son an acetaminophen 325 MG Tabs  Commonly known as: TYLENOL      Take 650 mg by mouth every 4 (four) hours as needed for Pain. Refills: 0     amLODIPine Besylate 5 MG Tabs  Commonly known as: NORVASC      Take 5 mg by mouth daily.    Refills: 0     aspirin [id number] - [address] [facility], [address], [phone], [address], [place] [address]    Phone: [phone]   · prednisoLONE acetate 1 % Susp     Please  your prescriptions at the locat

## 2022-11-02 ENCOUNTER — APPOINTMENT (OUTPATIENT)
Dept: GENERAL RADIOLOGY | Facility: HOSPITAL | Age: 62
End: 2022-11-02
Attending: EMERGENCY MEDICINE
Payer: MEDICARE

## 2022-11-02 ENCOUNTER — APPOINTMENT (OUTPATIENT)
Dept: CT IMAGING | Facility: HOSPITAL | Age: 62
End: 2022-11-02
Attending: EMERGENCY MEDICINE
Payer: MEDICARE

## 2022-11-02 ENCOUNTER — HOSPITAL ENCOUNTER (EMERGENCY)
Facility: HOSPITAL | Age: 62
Discharge: HOME | End: 2022-11-02
Attending: EMERGENCY MEDICINE
Payer: MEDICARE

## 2022-11-02 VITALS
DIASTOLIC BLOOD PRESSURE: 74 MMHG | SYSTOLIC BLOOD PRESSURE: 124 MMHG | TEMPERATURE: 97 F | HEART RATE: 51 BPM | OXYGEN SATURATION: 100 % | RESPIRATION RATE: 13 BRPM
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
## 2022-11-02 DIAGNOSIS — N30.00 ACUTE CYSTITIS WITHOUT HEMATURIA: ICD-10-CM

## 2022-11-02 LAB
ALBUMIN SERPL-MCNC: 4.1 G/DL (ref 3.4–5)
ALBUMIN/GLOB SERPL: 1.3 {RATIO} (ref 1–2)
ALP LIVER SERPL-CCNC: 86 U/L
ALT SERPL-CCNC: 30 U/L
ANION GAP SERPL CALC-SCNC: 6 MMOL/L (ref 0–18)
AST SERPL-CCNC: 31 U/L (ref 15–37)
ATRIAL RATE: 51 BPM
BASOPHILS # BLD AUTO: 0.03 X10(3) UL (ref 0–0.2)
BASOPHILS NFR BLD AUTO: 0.4 %
BILIRUB SERPL-MCNC: 1.2 MG/DL (ref 0.1–2)
BILIRUB UR QL STRIP.AUTO: NEGATIVE
BUN BLD-MCNC: 31 MG/DL (ref 7–18)
CALCIUM BLD-MCNC: 9.4 MG/DL (ref 8.5–10.1)
CHLORIDE SERPL-SCNC: 114 MMOL/L (ref 98–112)
CO2 SERPL-SCNC: 24 MMOL/L (ref 21–32)
COLOR UR AUTO: YELLOW
CREAT BLD-MCNC: 1.48 MG/DL
EOSINOPHIL # BLD AUTO: 0.07 X10(3) UL (ref 0–0.7)
EOSINOPHIL NFR BLD AUTO: 0.8 %
ERYTHROCYTE [DISTWIDTH] IN BLOOD BY AUTOMATED COUNT: 13.2 %
GFR SERPLBLD BASED ON 1.73 SQ M-ARVRAT: 53 ML/MIN/1.73M2 (ref 60–?)
GLOBULIN PLAS-MCNC: 3.2 G/DL (ref 2.8–4.4)
GLUCOSE BLD-MCNC: 138 MG/DL (ref 70–99)
GLUCOSE UR STRIP.AUTO-MCNC: NEGATIVE MG/DL
HCT VFR BLD AUTO: 46.7 %
HGB BLD-MCNC: 16 G/DL
HYALINE CASTS #/AREA URNS AUTO: PRESENT /LPF
IMM GRANULOCYTES # BLD AUTO: 0.04 X10(3) UL (ref 0–1)
IMM GRANULOCYTES NFR BLD: 0.5 %
KETONES UR STRIP.AUTO-MCNC: NEGATIVE MG/DL
LEUKOCYTE ESTERASE UR QL STRIP.AUTO: NEGATIVE
LYMPHOCYTES # BLD AUTO: 0.47 X10(3) UL (ref 1–4)
LYMPHOCYTES NFR BLD AUTO: 5.7 %
MCH RBC QN AUTO: 31.4 PG (ref 26–34)
MCHC RBC AUTO-ENTMCNC: 34.3 G/DL (ref 31–37)
MCV RBC AUTO: 91.7 FL
MONOCYTES # BLD AUTO: 0.44 X10(3) UL (ref 0.1–1)
MONOCYTES NFR BLD AUTO: 5.3 %
NEUTROPHILS # BLD AUTO: 7.25 X10 (3) UL (ref 1.5–7.7)
NEUTROPHILS # BLD AUTO: 7.25 X10(3) UL (ref 1.5–7.7)
NEUTROPHILS NFR BLD AUTO: 87.3 %
NITRITE UR QL STRIP.AUTO: NEGATIVE
OSMOLALITY SERPL CALC.SUM OF ELEC: 307 MOSM/KG (ref 275–295)
P AXIS: 35 DEGREES
P-R INTERVAL: 188 MS
PH UR STRIP.AUTO: 6 [PH] (ref 5–8)
PLATELET # BLD AUTO: 134 10(3)UL (ref 150–450)
POTASSIUM SERPL-SCNC: 4.3 MMOL/L (ref 3.5–5.1)
PROT SERPL-MCNC: 7.3 G/DL (ref 6.4–8.2)
PROT UR STRIP.AUTO-MCNC: 30 MG/DL
Q-T INTERVAL: 460 MS
QRS DURATION: 106 MS
QTC CALCULATION (BEZET): 423 MS
R AXIS: -44 DEGREES
RBC # BLD AUTO: 5.09 X10(6)UL
RBC #/AREA URNS AUTO: >10 /HPF
SARS-COV-2 RNA RESP QL NAA+PROBE: NOT DETECTED
SODIUM SERPL-SCNC: 144 MMOL/L (ref 136–145)
SP GR UR STRIP.AUTO: 1.02 (ref 1–1.03)
T AXIS: 31 DEGREES
TROPONIN I HIGH SENSITIVITY: 5 NG/L
UROBILINOGEN UR STRIP.AUTO-MCNC: 4 MG/DL
VENTRICULAR RATE: 51 BPM
WBC # BLD AUTO: 8.3 X10(3) UL (ref 4–11)

## 2022-11-02 PROCEDURE — 93005 ELECTROCARDIOGRAM TRACING: CPT

## 2022-11-02 PROCEDURE — 85025 COMPLETE CBC W/AUTO DIFF WBC: CPT | Performed by: EMERGENCY MEDICINE

## 2022-11-02 PROCEDURE — 93010 ELECTROCARDIOGRAM REPORT: CPT

## 2022-11-02 PROCEDURE — 81001 URINALYSIS AUTO W/SCOPE: CPT | Performed by: EMERGENCY MEDICINE

## 2022-11-02 PROCEDURE — 80053 COMPREHEN METABOLIC PANEL: CPT | Performed by: EMERGENCY MEDICINE

## 2022-11-02 PROCEDURE — 84484 ASSAY OF TROPONIN QUANT: CPT | Performed by: EMERGENCY MEDICINE

## 2022-11-02 PROCEDURE — 71045 X-RAY EXAM CHEST 1 VIEW: CPT | Performed by: EMERGENCY MEDICINE

## 2022-11-02 PROCEDURE — 36415 COLL VENOUS BLD VENIPUNCTURE: CPT

## 2022-11-02 PROCEDURE — 70450 CT HEAD/BRAIN W/O DYE: CPT | Performed by: EMERGENCY MEDICINE

## 2022-11-02 PROCEDURE — 72125 CT NECK SPINE W/O DYE: CPT | Performed by: EMERGENCY MEDICINE

## 2022-11-02 PROCEDURE — 99285 EMERGENCY DEPT VISIT HI MDM: CPT

## 2022-11-02 PROCEDURE — 99284 EMERGENCY DEPT VISIT MOD MDM: CPT

## 2022-11-02 RX ORDER — CEPHALEXIN 500 MG/1
500 CAPSULE ORAL 4 TIMES DAILY
Qty: 28 CAPSULE | Refills: 0 | Status: SHIPPED | OUTPATIENT
Start: 2022-11-02 | End: 2022-11-09

## 2022-11-02 NOTE — ED INITIAL ASSESSMENT (HPI)
Patient to ER from memory care unit via EMS. Staff found patient on floor this AM. Unwitnessed. A/ox1 per baseline. Staff states patient is more lethargic today. Usually patient is wondering and staff has trouble keeping him to sit still. c-collar in place from medics. Blood sugar 89 for medics.

## 2023-02-03 ENCOUNTER — LAB REQUISITION (OUTPATIENT)
Dept: LAB | Facility: HOSPITAL | Age: 63
End: 2023-02-03
Payer: MEDICARE

## 2023-02-03 DIAGNOSIS — F03.90 UNSPECIFIED DEMENTIA, UNSPECIFIED SEVERITY, WITHOUT BEHAVIORAL DISTURBANCE, PSYCHOTIC DISTURBANCE, MOOD DISTURBANCE, AND ANXIETY (HCC): ICD-10-CM

## 2023-02-03 LAB
ALBUMIN SERPL-MCNC: 3.5 G/DL (ref 3.4–5)
ALBUMIN/GLOB SERPL: 1.1 {RATIO} (ref 1–2)
ALP LIVER SERPL-CCNC: 74 U/L
ALT SERPL-CCNC: 30 U/L
ANION GAP SERPL CALC-SCNC: 6 MMOL/L (ref 0–18)
AST SERPL-CCNC: 50 U/L (ref 15–37)
BASOPHILS # BLD AUTO: 0.06 X10(3) UL (ref 0–0.2)
BASOPHILS NFR BLD AUTO: 0.9 %
BILIRUB SERPL-MCNC: 1 MG/DL (ref 0.1–2)
BUN BLD-MCNC: 30 MG/DL (ref 7–18)
CALCIUM BLD-MCNC: 7.9 MG/DL (ref 8.5–10.1)
CHLORIDE SERPL-SCNC: 117 MMOL/L (ref 98–112)
CO2 SERPL-SCNC: 19 MMOL/L (ref 21–32)
CREAT BLD-MCNC: 1.27 MG/DL
EOSINOPHIL # BLD AUTO: 0.2 X10(3) UL (ref 0–0.7)
EOSINOPHIL NFR BLD AUTO: 3 %
ERYTHROCYTE [DISTWIDTH] IN BLOOD BY AUTOMATED COUNT: 13.5 %
GFR SERPLBLD BASED ON 1.73 SQ M-ARVRAT: 63 ML/MIN/1.73M2 (ref 60–?)
GLOBULIN PLAS-MCNC: 3.1 G/DL (ref 2.8–4.4)
GLUCOSE BLD-MCNC: 71 MG/DL (ref 70–99)
HCT VFR BLD AUTO: 49.1 %
HGB BLD-MCNC: 16.4 G/DL
IMM GRANULOCYTES # BLD AUTO: 0.02 X10(3) UL (ref 0–1)
IMM GRANULOCYTES NFR BLD: 0.3 %
LYMPHOCYTES # BLD AUTO: 1.45 X10(3) UL (ref 1–4)
LYMPHOCYTES NFR BLD AUTO: 22 %
MCH RBC QN AUTO: 31.5 PG (ref 26–34)
MCHC RBC AUTO-ENTMCNC: 33.4 G/DL (ref 31–37)
MCV RBC AUTO: 94.4 FL
MONOCYTES # BLD AUTO: 0.6 X10(3) UL (ref 0.1–1)
MONOCYTES NFR BLD AUTO: 9.1 %
NEUTROPHILS # BLD AUTO: 4.26 X10 (3) UL (ref 1.5–7.7)
NEUTROPHILS # BLD AUTO: 4.26 X10(3) UL (ref 1.5–7.7)
NEUTROPHILS NFR BLD AUTO: 64.7 %
OSMOLALITY SERPL CALC.SUM OF ELEC: 299 MOSM/KG (ref 275–295)
PLATELET # BLD AUTO: 113 10(3)UL (ref 150–450)
POTASSIUM SERPL-SCNC: 4.7 MMOL/L (ref 3.5–5.1)
PROT SERPL-MCNC: 6.6 G/DL (ref 6.4–8.2)
RBC # BLD AUTO: 5.2 X10(6)UL
SODIUM SERPL-SCNC: 142 MMOL/L (ref 136–145)
WBC # BLD AUTO: 6.6 X10(3) UL (ref 4–11)

## 2023-02-03 PROCEDURE — 80053 COMPREHEN METABOLIC PANEL: CPT | Performed by: INTERNAL MEDICINE

## 2023-02-03 PROCEDURE — 85025 COMPLETE CBC W/AUTO DIFF WBC: CPT | Performed by: INTERNAL MEDICINE

## 2023-03-13 ENCOUNTER — LAB REQUISITION (OUTPATIENT)
Dept: LAB | Facility: HOSPITAL | Age: 63
End: 2023-03-13
Payer: MEDICARE

## 2023-03-13 DIAGNOSIS — U07.1 COVID-19: ICD-10-CM

## 2023-03-13 LAB
ALBUMIN SERPL-MCNC: 3.5 G/DL (ref 3.4–5)
ALBUMIN/GLOB SERPL: 1.2 {RATIO} (ref 1–2)
ALP LIVER SERPL-CCNC: 82 U/L
ALT SERPL-CCNC: 29 U/L
ANION GAP SERPL CALC-SCNC: 2 MMOL/L (ref 0–18)
AST SERPL-CCNC: 23 U/L (ref 15–37)
BASOPHILS # BLD AUTO: 0.05 X10(3) UL (ref 0–0.2)
BASOPHILS NFR BLD AUTO: 0.6 %
BILIRUB SERPL-MCNC: 0.6 MG/DL (ref 0.1–2)
BUN BLD-MCNC: 33 MG/DL (ref 7–18)
CALCIUM BLD-MCNC: 8.7 MG/DL (ref 8.5–10.1)
CHLORIDE SERPL-SCNC: 116 MMOL/L (ref 98–112)
CO2 SERPL-SCNC: 26 MMOL/L (ref 21–32)
CREAT BLD-MCNC: 1.32 MG/DL
EOSINOPHIL # BLD AUTO: 0.33 X10(3) UL (ref 0–0.7)
EOSINOPHIL NFR BLD AUTO: 3.7 %
ERYTHROCYTE [DISTWIDTH] IN BLOOD BY AUTOMATED COUNT: 13.2 %
GFR SERPLBLD BASED ON 1.73 SQ M-ARVRAT: 60 ML/MIN/1.73M2 (ref 60–?)
GLOBULIN PLAS-MCNC: 2.9 G/DL (ref 2.8–4.4)
GLUCOSE BLD-MCNC: 87 MG/DL (ref 70–99)
HCT VFR BLD AUTO: 47 %
HGB BLD-MCNC: 15.6 G/DL
IMM GRANULOCYTES # BLD AUTO: 0.03 X10(3) UL (ref 0–1)
IMM GRANULOCYTES NFR BLD: 0.3 %
LYMPHOCYTES # BLD AUTO: 1.18 X10(3) UL (ref 1–4)
LYMPHOCYTES NFR BLD AUTO: 13.3 %
MCH RBC QN AUTO: 31 PG (ref 26–34)
MCHC RBC AUTO-ENTMCNC: 33.2 G/DL (ref 31–37)
MCV RBC AUTO: 93.3 FL
MONOCYTES # BLD AUTO: 0.62 X10(3) UL (ref 0.1–1)
MONOCYTES NFR BLD AUTO: 7 %
NEUTROPHILS # BLD AUTO: 6.63 X10 (3) UL (ref 1.5–7.7)
NEUTROPHILS # BLD AUTO: 6.63 X10(3) UL (ref 1.5–7.7)
NEUTROPHILS NFR BLD AUTO: 75.1 %
OSMOLALITY SERPL CALC.SUM OF ELEC: 305 MOSM/KG (ref 275–295)
PLATELET # BLD AUTO: 141 10(3)UL (ref 150–450)
POTASSIUM SERPL-SCNC: 4 MMOL/L (ref 3.5–5.1)
PROT SERPL-MCNC: 6.4 G/DL (ref 6.4–8.2)
RBC # BLD AUTO: 5.04 X10(6)UL
SODIUM SERPL-SCNC: 144 MMOL/L (ref 136–145)
WBC # BLD AUTO: 8.8 X10(3) UL (ref 4–11)

## 2023-03-13 PROCEDURE — 80053 COMPREHEN METABOLIC PANEL: CPT | Performed by: INTERNAL MEDICINE

## 2023-03-13 PROCEDURE — 85025 COMPLETE CBC W/AUTO DIFF WBC: CPT | Performed by: INTERNAL MEDICINE

## (undated) NOTE — IP AVS SNAPSHOT
Patient Demographics     Address  [address] [place] [address]  C/O Casie Perez [address] Phone  [phone] (Home) *Preferred*  [phone] [facility]) E-mail Address  [email or web address]      Emergency Contact(s)     Name Relation Home Work Mobile Levothyroxine Sodium 125 MCG Tabs  Next dose due: 6/1 - before breakfast      Take 125 mcg by mouth before breakfast.          lisinopril 10 MG Tabs  Next dose due: 6/1 - morning      Take 10 mg by mouth daily.           LORazepam 0.5 MG Tabs  Commonly know medications  valACYclovir HCl 1 G Tabs           [id number] - MAR ACTION REPORT  (last 24 hrs)    ** SITE UNKNOWN **     Order ID Medication Name Action Time Action Reason Comments    [id number] 0.9% NaCl infusion 05/30/21 1604 New Bag      [id number] 0.9% Component Value Units Date/Time    Rapid SARS-CoV-2 by PCR STAT [[id number]]  (Normal) Collected: 05/27/21 1651    Order Status: Completed Lab Status: Final result Updated: 05/27/21 2588    Specimen: Other from Nares      Rapid SARS-CoV-2 by PCR Not Detecte Problem Relation Age of Onset   • Stroke Other    • Cancer Other    • Heart Disease Other      Allergies: No Known Allergies    Medications:  No current facility-administered medications on file prior to encounter.   LORazepam 0.5 MG Oral Tab, Take 1 tablet bowel sounds. No rebound, guarding or organomegaly. Neurologic: No focal neurological deficits. CNII-XII grossly intact. Musculoskeletal: Moves all extremities. Extremities: No edema or cyanosis.   Integument: abrasion on forehead      Diagnostic Data: Sanju Mcqueen MD on 5/27/2021  7:43 PM               H&P signed by Thania Mendez MD at 5/27/2021  7:42 PM  Version 1 of 2    Author: Thania Mendez MD Service: — Author Type: Physician    Filed: 5/27/2021  7:42 PM Date of Service: 5/27/2021  7:28 PM Status: Antonio Rae 1 tablet (0.5 mg total) by mouth 2 (two) times daily as needed for Anxiety. , Disp: 30 tablet, Rfl: 0  QUEtiapine Fumarate 50 MG Oral Tab, Take 50 mg by mouth nightly., Disp: , Rfl:   ALPRAZolam 0.5 MG Oral Tab, Take 0.5 mg by mouth nightly as needed for Sl Data:      Labs:  Recent Labs   Lab 05/27/21  1651   WBC 6.6   HGB 15.8   MCV 90.2   .0*       Recent Labs   Lab 05/27/21  1651   *   BUN 27*   CREATSERUM 1.37*   GFRAA 63   GFRNAA 55*   CA 8.1*   ALB 3.6      K 3.8      CO2 24.0 ATTENDING PHYSICIAN: ROMA Simmsms: Sonya Julian M.D.    PATIENT ACCOUNT#:   [de-identified]    LOCATION:  [address] [place] [address]  MEDICAL RECORD #:   [id number]       YOB: 1960  ADMISSION DATE:       05/27/2021      CONSUL follow up within 1 week of discharge. Dictated By Josephine Galindo M.D.  d: 05/29/2021 09:21:54  t: 05/29/2021 12:05:13  [facility] [id number]  AS/  [AS.1]   Attribution Key    AS. 1 - Rita Whitehead MD on 5/29/2021  3:43 PM                     D/C Summary nurse that therapy will discharge for now and can be re-consulted if a need arises. [MM.1]       Attribution Osorio    MM. 1 - Kody Blum OT on 5/29/2021  2:46 PM                     Video Swallow Study Notes    No notes of this type exist for this enco

## (undated) NOTE — ED AVS SNAPSHOT
Zac Choi   MRN: [id number]    Department:  [facility] Emergency Department   Date of Visit:  9/7/2018           Disclosure     Insurance plans vary and the physician(s) referred by the ER may not be covered by your plan.  Please contact you tell this physician (or your personal doctor if your instructions are to return to your personal doctor) about any new or lasting problems. The primary care or specialist physician will see patients referred from the [facility] Emergency Department.  Cesia Man

## (undated) NOTE — IP AVS SNAPSHOT
[address]            (For Outpatient Use Only) Initial Admit Date: 5/27/2021   Inpt/Obs Admit Date: Inpt: 5/27/21 / Obs: N/A   Discharge Date:    Yessenia Luz:  [de-identified]   MRN: [de-identified]   CSN: [id number]   CEID: [id number] :    Subscriber ID:  Pt Rel to Subscriber:    Hospital Account Financial Class: Medicare    May 31, 2021